# Patient Record
Sex: MALE | Race: WHITE | NOT HISPANIC OR LATINO | URBAN - METROPOLITAN AREA
[De-identification: names, ages, dates, MRNs, and addresses within clinical notes are randomized per-mention and may not be internally consistent; named-entity substitution may affect disease eponyms.]

---

## 2017-07-07 ENCOUNTER — EMERGENCY (EMERGENCY)
Facility: HOSPITAL | Age: 62
LOS: 1 days | Discharge: HOME | End: 2017-07-07
Attending: EMERGENCY MEDICINE
Payer: SELF-PAY

## 2017-07-07 VITALS
TEMPERATURE: 98 F | OXYGEN SATURATION: 98 % | HEART RATE: 88 BPM | WEIGHT: 195.11 LBS | HEIGHT: 71 IN | RESPIRATION RATE: 18 BRPM | DIASTOLIC BLOOD PRESSURE: 81 MMHG | SYSTOLIC BLOOD PRESSURE: 129 MMHG

## 2017-07-07 VITALS
DIASTOLIC BLOOD PRESSURE: 73 MMHG | OXYGEN SATURATION: 98 % | HEART RATE: 92 BPM | RESPIRATION RATE: 17 BRPM | SYSTOLIC BLOOD PRESSURE: 149 MMHG

## 2017-07-07 DIAGNOSIS — M79.671 PAIN IN RIGHT FOOT: ICD-10-CM

## 2017-07-07 DIAGNOSIS — Z79.2 LONG TERM (CURRENT) USE OF ANTIBIOTICS: ICD-10-CM

## 2017-07-07 DIAGNOSIS — M25.561 PAIN IN RIGHT KNEE: ICD-10-CM

## 2017-07-07 PROCEDURE — 99284 EMERGENCY DEPT VISIT MOD MDM: CPT | Mod: 25

## 2017-07-07 PROCEDURE — 99283 EMERGENCY DEPT VISIT LOW MDM: CPT

## 2017-07-07 PROCEDURE — 73562 X-RAY EXAM OF KNEE 3: CPT

## 2017-07-07 PROCEDURE — 73630 X-RAY EXAM OF FOOT: CPT | Mod: 26,LT

## 2017-07-07 PROCEDURE — 73630 X-RAY EXAM OF FOOT: CPT

## 2017-07-07 PROCEDURE — 73562 X-RAY EXAM OF KNEE 3: CPT | Mod: 26,RT

## 2017-07-07 RX ORDER — IBUPROFEN 200 MG
600 TABLET ORAL ONCE
Qty: 0 | Refills: 0 | Status: COMPLETED | OUTPATIENT
Start: 2017-07-07 | End: 2017-07-07

## 2017-07-07 RX ADMIN — Medication 600 MILLIGRAM(S): at 08:54

## 2017-07-07 NOTE — ED PROVIDER NOTE - OBJECTIVE STATEMENT
63 y/o M pt w/ no significant PMHx presents to the ED for R knee pain and R heel pain today. Pt is concerned for his R knee pain because a family member of his passed away due to R knee cancer after two weeks of their sx being onset. 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 Pt denies weight loss, fevers/chills, or any other complaints. NKDA.

## 2017-07-07 NOTE — ED ADULT NURSE NOTE - OBJECTIVE STATEMENT
Patient complains of right knee pain x 4 days. Denies any trauma. No swelling, gross deformity noted. Ambulated with steady gait.

## 2017-07-07 NOTE — ED ADULT TRIAGE NOTE - CHIEF COMPLAINT QUOTE
c/o right knee pain x 4 days, denies injury, pt is concerned because his uncle had knee cancer, ambulates steadily

## 2017-07-07 NOTE — ED PROVIDER NOTE - PROGRESS NOTE DETAILS
Warren Spivey was the scribe assigned to work with Dr. Dr. Ange Moise on 04/07/2017. I, Janet Mackenzie (Chief Scribe), am signing on behalf of Warren Spivey and attestating to the fact that Warren Spivey  worked the assigned shift with Dr. Dr. Ange Moise on providing scribe services.

## 2017-07-07 NOTE — ED PROVIDER NOTE - MEDICAL DECISION MAKING DETAILS
Pt presents with R knee pain and R heel pain. Will get X-ray of R knee and x-ray of R foot. Will wash foot and check for any obvious foreign body. Will give Motrin for pain.

## 2017-10-04 ENCOUNTER — EMERGENCY (EMERGENCY)
Facility: HOSPITAL | Age: 62
LOS: 1 days | Discharge: HOME | End: 2017-10-04
Attending: EMERGENCY MEDICINE
Payer: MEDICARE

## 2017-10-04 VITALS
DIASTOLIC BLOOD PRESSURE: 78 MMHG | WEIGHT: 199.96 LBS | HEIGHT: 70 IN | SYSTOLIC BLOOD PRESSURE: 122 MMHG | OXYGEN SATURATION: 99 % | HEART RATE: 100 BPM | RESPIRATION RATE: 16 BRPM | TEMPERATURE: 98 F
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
## 2017-10-04 DIAGNOSIS — M79.671 PAIN IN RIGHT FOOT: ICD-10-CM
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
## 2017-10-04 DIAGNOSIS — S91.341A PUNCTURE WOUND WITH FOREIGN BODY, RIGHT FOOT, INITIAL ENCOUNTER: ICD-10-CM
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
## 2017-10-04 DIAGNOSIS — H93.293 OTHER ABNORMAL AUDITORY PERCEPTIONS, BILATERAL: ICD-10-CM

## 2017-10-04 PROCEDURE — 73630 X-RAY EXAM OF FOOT: CPT

## 2017-10-04 PROCEDURE — 73630 X-RAY EXAM OF FOOT: CPT | Mod: 26,RT

## 2017-10-04 PROCEDURE — 99284 EMERGENCY DEPT VISIT MOD MDM: CPT | Mod: 25

## 2017-10-04 PROCEDURE — 99284 EMERGENCY DEPT VISIT MOD MDM: CPT

## 2017-10-04 NOTE — CONSULT NOTE ADULT - SUBJECTIVE AND OBJECTIVE BOX
S : 62y year old Male seen in the ED for Right foot pain.      HPI:  Patient states he possibly stepped on glass or some other material. Patient relates to having pain on the foot for about 1 week.  Patient has had no previous treatment for the pain. patient denies any Fever, Nausea, vomiting, and shortness of breath.    PMH: No pertinent past medical history  [place] (hard of hearing), bilateral    PSH:No significant past surgical history      Allergies:No Known Allergies      T(F): 97.8 (10-04-17 @ 07:34), Max: 97.8 (10-04-17 @ 07:34)  HR: 100 (10-04-17 @ 07:34) (100 - 100)  BP: 122/78 (10-04-17 @ 07:34) (122/78 - 122/78)  RR: 16 (10-04-17 @ 07:34) (16 - 16)  SpO2: 99% (10-04-17 @ 07:34) (99% - 99%)  Wt(kg): --    O:   General: Pleasant  male NAD & AOX3.    Integument:  Skin warm, dry and supple bilateral.    Vascular: Dorsalis Pedis and Posterior Tibial pulses 2/4.  Capillary re-fill time less then 3 seconds digits 1-5 bilateral.    Neuro: Protective sensation intact to the level of the digits bilateral.  MSK: Muscle strength 5/5 all major muscle groups bilateral.  Deformity:  A: Right foot pain secondary to possible foreign body      P:   Chart reviewed and Patient evaluated  Discussed diagnosis and treatment with patient  Cleaned affected area with alcohol pads.  2% lidocaine was injected around the affected area   Callous and hematoma at the affected area was debrided.  Unsuccessful attempt at removing any foreign body  Applied dry sterile dressing  X-rays reviewed : Shows foreign body at base of 5th proximal phalanx away from site of complaint.   Ordered RAISA  Patient is to return Wednesday(10/11/17) at 10:15AM for removal of possible foreign body.  Patient has been booked for surgery on Wednesday.  Discussed with Dr. Oliva

## 2017-10-04 NOTE — ED PROVIDER NOTE - MEDICAL DECISION MAKING DETAILS
63 y/o M pt presents with R foot pain and foreign body sensation in R foot. Plan for X-Ray of R foot, podiatry consult, and reassess. Pt declined analgesia.

## 2017-10-04 NOTE — ED PROVIDER NOTE - PROGRESS NOTE DETAILS
pt seen by podiatry.  consult appreciated.  Will d/c with podiatry follow up. Pt phone number is [phone]

## 2018-04-05 ENCOUNTER — EMERGENCY (EMERGENCY)
Facility: HOSPITAL | Age: 63
LOS: 1 days | Discharge: HOME | End: 2018-04-05
Attending: EMERGENCY MEDICINE
Payer: MEDICARE

## 2018-04-05 VITALS
SYSTOLIC BLOOD PRESSURE: 144 MMHG | HEART RATE: 77 BPM | RESPIRATION RATE: 18 BRPM | TEMPERATURE: 98 F | DIASTOLIC BLOOD PRESSURE: 86 MMHG | OXYGEN SATURATION: 100 %

## 2018-04-05 VITALS
SYSTOLIC BLOOD PRESSURE: 145 MMHG | HEART RATE: 98 BPM | TEMPERATURE: 98 F | RESPIRATION RATE: 16 BRPM | OXYGEN SATURATION: 98 % | DIASTOLIC BLOOD PRESSURE: 81 MMHG

## 2018-04-05 PROCEDURE — 99284 EMERGENCY DEPT VISIT MOD MDM: CPT

## 2018-04-05 PROCEDURE — 99284 EMERGENCY DEPT VISIT MOD MDM: CPT | Mod: 25

## 2018-04-05 PROCEDURE — 11000 DBRDMT ECZ/INFECTED SKIN<10%: CPT

## 2018-04-05 PROCEDURE — 73620 X-RAY EXAM OF FOOT: CPT

## 2018-04-05 PROCEDURE — 73620 X-RAY EXAM OF FOOT: CPT | Mod: 26,50

## 2018-04-05 NOTE — ED PROVIDER NOTE - OBJECTIVE STATEMENT
61 y/o M pt with PMHx of [place] and no significant PSHx presents to ED c/o foreign body in b/l feet x2 days. 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 Pt now notes b/l foot pain. Per pt, glass is in the heels of b/l feet. Pt reports having had similar episodes of glass in b/l feet on multiple occasions in the past. Pt denies any other complaints. NKDA.

## 2018-04-05 NOTE — CONSULT NOTE ADULT - SUBJECTIVE AND OBJECTIVE BOX
S : 62y year old Male seen at bedside for stepping on glass bilateral feet.  Patient states he was walking barefoot and felt that he stepped on glass. Patient complains of pain in both feet, particularly the heel. Patient pinpoint to an area of pain. Patient states he attempted to take out the glass at home, however was unsuccessful hence he came into the ED.     Chief Complaint : Patient is a 62y old  Male who presents with a chief complaint of glass in feet     HPI: 61 y/o M pt with PMHx of [place] and no significant PSHx presents to ED c/o foreign body in b/l feet x2 days. 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 Pt now notes b/l foot pain. Per pt, glass is in the heels of b/l feet. Pt reports having had similar episodes of glass in b/l feet on multiple occasions in the past. Pt denies any other complaints. NKDA.    Patient admits to  (-) Fevers, (-) Chills, (-) Nausea, (-) Vomiting, (-) Shortness of Breath      PMH: No pertinent past medical history  [place] (hard of hearing), bilateral    PSH:No significant past surgical history      Allergies:No Known Allergies      Labs:      WBC Trend      Chem              T(F): 98.5 (04-05-18 @ 19:40), Max: 98.5 (04-05-18 @ 19:40)  HR: 77 (04-05-18 @ 19:40) (77 - 98)  BP: 144/86 (04-05-18 @ 19:40) (144/86 - 145/81)  RR: 18 (04-05-18 @ 19:40) (16 - 18)  SpO2: 100% (04-05-18 @ 19:40) (98% - 100%)  Wt(kg): --    O:   General: Pleasant  male NAD & AOX3.    Integument:  Skin warm, dry and supple bilateral.    Dermatological: Both plantar feet filled with dirt, nails elongated and mycotic in nature. Elevated heel surface consistent with foreign body presence.   Neuro: Protective sensation intact/diminished to the level of the digits bilateral.  MSK: Muscle strength 5/5 all major muscle groups bilateral. POP of bilateral heels         Deformity:  A: Bilateral feet, foreign body present       P:   Chart reviewed and Patient evaluated  Discussed diagnosis and treatment with patient  Bedside procedure performed: Excisional debridement of skin using #15 blade inclusive of skin of right and left foot. Foreign body taken out from bilateral heels. Site flushed wither sterile saline. Applied bandaid.   X-rays reviewed : Presence of foreign body bilateral heels   Patient told about clinical signs of infection and if present along with increase of pain to return to ED  Patient to follow [facility] t [address] [facility] [id number]   Discussed with Dr. Oliva

## 2018-04-05 NOTE — ED PROVIDER NOTE - ATTENDING CONTRIBUTION TO CARE
61 y/o M pt presents with foreign body sensation to b/l feet. Plan for X-Ray of b/l feet, and will reassess.

## 2018-04-05 NOTE — ED ADULT TRIAGE NOTE - CHIEF COMPLAINT QUOTE
C/o "I have glass in my right foot. There may be some in my left foot also. 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

## 2018-04-05 NOTE — ED PROVIDER NOTE - MEDICAL DECISION MAKING DETAILS
63 y/o M pt presents with foreign body sensation to b/l feet. Plan for X-Ray of b/l feet, and will reassess.

## 2018-04-05 NOTE — ED PROVIDER NOTE - PROGRESS NOTE DETAILS
Dr Alejandro (podiatry) removed 3 pieces of glasses. Will dc with outpatient follow up at the [address] [place] [address]. Pt is well appearing walking with steady gait, stable for discharge and follow up without fail with medical doctor. I had a detailed discussion with the patient and/or guardian regarding the historical points, exam findings, and any diagnostic results supporting the discharge diagnosis. Pt educated on care and need for follow up. Strict return instructions and red flag signs and symptoms discussed with patient. Questions answered. Pt shows understanding of discharge information and agrees to follow.

## 2018-06-18 NOTE — ED PROVIDER NOTE - OBJECTIVE STATEMENT
Patient/Caregiver provided printed discharge information. 61 y/o M pt with PMHx of [place] and no significant PSHx presents to ED c/o foreign body sensation in R foot s/p stepping on a piece of glass x3 days ago. As per pt, pt is concerned for foreign body in R foot due to increasing/worsening pain in R foot, prompting pt to visit ED today for further evaluation. Pt notes he is UTD with tetanus vaccination. Pt denies any other complaints. NKDA.

## 2019-09-09 ENCOUNTER — EMERGENCY (EMERGENCY)
Facility: HOSPITAL | Age: 64
LOS: 1 days | Discharge: HOME | End: 2019-09-09
Attending: EMERGENCY MEDICINE
Payer: SUBSIDIZED

## 2019-09-09 VITALS
HEIGHT: 71 IN | SYSTOLIC BLOOD PRESSURE: 148 MMHG | WEIGHT: 190.04 LBS | DIASTOLIC BLOOD PRESSURE: 104 MMHG | RESPIRATION RATE: 20 BRPM | TEMPERATURE: 98 F | HEART RATE: 104 BPM | OXYGEN SATURATION: 98 %

## 2019-09-09 PROCEDURE — 99283 EMERGENCY DEPT VISIT LOW MDM: CPT | Mod: 25

## 2019-09-09 PROCEDURE — 73630 X-RAY EXAM OF FOOT: CPT

## 2019-09-09 PROCEDURE — 73630 X-RAY EXAM OF FOOT: CPT | Mod: 26,RT

## 2019-09-09 PROCEDURE — 99283 EMERGENCY DEPT VISIT LOW MDM: CPT

## 2019-09-09 RX ORDER — IBUPROFEN 200 MG
1 TABLET ORAL
Qty: 30 | Refills: 0
Start: 2019-09-09

## 2019-09-09 RX ORDER — AZTREONAM 2 G
1 VIAL (EA) INJECTION
Qty: 20 | Refills: 0
Start: 2019-09-09 | End: 2019-09-18

## 2019-09-09 NOTE — ED PROVIDER NOTE - PROGRESS NOTE DETAILS
Patient XR does not demonstrate any evidence of foreign body in patient laceration, however there are multiple punctate spots on patient's XR, likely due to dirt and soil on patients foot. Will discharge home with antibiotics and podiatry follow up.

## 2019-09-09 NOTE — ED PROVIDER NOTE - OBJECTIVE STATEMENT
64 y.o. M with a pertinent PMHx of Hard of hearing B/L and no pertinent PSHx presents to the ED for evaluation and possible foreign body removal, c/o R foot pain since today, status post stepping on what he believes to be a piece of glass that then lodged in his foot 2 days ago. Patient did not have pain until today and feels it in location of his cut, and relates that the pain is exacerbated when he puts pressure on his foot. He endorses his tetanus vaccine is UTD as of 6 months ago. Otherwise, patient denies a hx of DM, numbness or weakness in the foot, or any other acute complaints at this time. NKDA.

## 2019-09-09 NOTE — ED PROVIDER NOTE - PATIENT PORTAL LINK FT
You can access the FollowMyHealth Patient Portal offered by [facility] by registering at the following website: [email or web address]. By joining [employer]’s FollowMyHealth portal, you will also be able to view your health information using other applications (apps) compatible with our system.

## 2019-09-09 NOTE — ED ADULT NURSE NOTE - NSIMPLEMENTINTERV_GEN_ALL_ED
Implemented All Universal Safety Interventions:  [place] to call system. Call bell, personal items and telephone within reach. Instruct patient to call for assistance. Room bathroom lighting operational. Non-slip footwear when patient is off stretcher. Physically safe environment: no spills, clutter or unnecessary equipment. Stretcher in lowest position, wheels locked, appropriate side rails in place.

## 2019-09-09 NOTE — ED PROVIDER NOTE - SKIN WOUND DESCRIPTION
Less than 1 cm linear, non-gaping laceration under the head of the 1st metatarsal. Good pulses, good sensation, no deformity, no foreign bodies, no bleeding

## 2020-08-17 ENCOUNTER — EMERGENCY (EMERGENCY)
Facility: HOSPITAL | Age: 65
LOS: 1 days | Discharge: HOME | End: 2020-08-17
Attending: STUDENT IN AN ORGANIZED HEALTH CARE EDUCATION/TRAINING PROGRAM
Payer: SELF-PAY

## 2020-08-17 VITALS
DIASTOLIC BLOOD PRESSURE: 88 MMHG | TEMPERATURE: 99 F | RESPIRATION RATE: 20 BRPM | SYSTOLIC BLOOD PRESSURE: 151 MMHG | OXYGEN SATURATION: 99 % | HEART RATE: 92 BPM

## 2020-08-17 VITALS
HEART RATE: 84 BPM | TEMPERATURE: 98 F | SYSTOLIC BLOOD PRESSURE: 128 MMHG | WEIGHT: 222.67 LBS | RESPIRATION RATE: 20 BRPM | OXYGEN SATURATION: 95 % | HEIGHT: 68 IN | DIASTOLIC BLOOD PRESSURE: 83 MMHG

## 2020-08-17 PROCEDURE — 90715 TDAP VACCINE 7 YRS/> IM: CPT

## 2020-08-17 PROCEDURE — 82962 GLUCOSE BLOOD TEST: CPT

## 2020-08-17 PROCEDURE — 99283 EMERGENCY DEPT VISIT LOW MDM: CPT | Mod: 25

## 2020-08-17 PROCEDURE — 73620 X-RAY EXAM OF FOOT: CPT | Mod: 26,50

## 2020-08-17 PROCEDURE — 99283 EMERGENCY DEPT VISIT LOW MDM: CPT

## 2020-08-17 PROCEDURE — 73620 X-RAY EXAM OF FOOT: CPT

## 2020-08-17 PROCEDURE — 90471 IMMUNIZATION ADMIN: CPT

## 2020-08-17 RX ORDER — LIDOCAINE HYDROCHLORIDE AND EPINEPHRINE 10; 10 MG/ML; UG/ML
10 INJECTION, SOLUTION INFILTRATION; PERINEURAL ONCE
Refills: 0 | Status: COMPLETED | OUTPATIENT
Start: 2020-08-17 | End: 2020-08-17

## 2020-08-17 RX ORDER — BACITRACIN ZINC 500 UNIT/G
1 OINTMENT IN PACKET (EA) TOPICAL ONCE
Refills: 0 | Status: COMPLETED | OUTPATIENT
Start: 2020-08-17 | End: 2020-08-17

## 2020-08-17 RX ORDER — TETANUS TOXOID, REDUCED DIPHTHERIA TOXOID AND ACELLULAR PERTUSSIS VACCINE, ADSORBED 5; 2.5; 8; 8; 2.5 [IU]/.5ML; [IU]/.5ML; UG/.5ML; UG/.5ML; UG/.5ML
0.5 SUSPENSION INTRAMUSCULAR ONCE
Refills: 0 | Status: COMPLETED | OUTPATIENT
Start: 2020-08-17 | End: 2020-08-17

## 2020-08-17 RX ORDER — ACETAMINOPHEN 500 MG
650 TABLET ORAL ONCE
Refills: 0 | Status: COMPLETED | OUTPATIENT
Start: 2020-08-17 | End: 2020-08-17

## 2020-08-17 RX ADMIN — Medication 1 APPLICATION(S): at 12:00

## 2020-08-17 RX ADMIN — Medication 650 MILLIGRAM(S): at 09:30

## 2020-08-17 RX ADMIN — LIDOCAINE HYDROCHLORIDE AND EPINEPHRINE 10 MILLILITER(S): 10; 10 INJECTION, SOLUTION INFILTRATION; PERINEURAL at 09:19

## 2020-08-17 RX ADMIN — TETANUS TOXOID, REDUCED DIPHTHERIA TOXOID AND ACELLULAR PERTUSSIS VACCINE, ADSORBED 0.5 MILLILITER(S): 5; 2.5; 8; 8; 2.5 SUSPENSION INTRAMUSCULAR at 09:55

## 2020-08-17 RX ADMIN — Medication 650 MILLIGRAM(S): at 09:18

## 2020-08-17 NOTE — ED ADULT NURSE NOTE - OBJECTIVE STATEMENT
64 yo M [facility], no meds at home p/w pain to the bottom of bilat feet with glass in the bottom of bilat feet. 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 Pt felt well before the event and denies falling, other pain/injuries, fever/infectious symptoms, chest pain, focal numbness/weakness, N/V, other recent illness or hospitalizations

## 2020-08-17 NOTE — ED PROVIDER NOTE - PROGRESS NOTE DETAILS
0.5cm piece of glass removed with tweezers from R heel. No other FBs found. Pt reports improvement of pain.

## 2020-08-17 NOTE — ED PROVIDER NOTE - PATIENT PORTAL LINK FT
You can access the FollowMyHealth Patient Portal offered by [facility] by registering at the following website: [email or web address]. By joining [employer]’s FollowMyHealth portal, you will also be able to view your health information using other applications (apps) compatible with our system.

## 2020-08-17 NOTE — ED PROVIDER NOTE - PHYSICAL EXAMINATION
Vital Signs Reviewed  GEN: Comfortable, NAD, AAOx3  HEENT: NCAT, EOMI, MMM, Neck Supple  RESP: CTAB, No rales/rhonchi/wheezing  CV: RRR, S1S2, No murmurs  ABD: No TTP, ND, No masses, No CVA Tenderness  Extrem/Skin: Equal pulses bilat, No cyanosis/edema/rashes  Bilat feet soiled with small amount of dried blood on bottom of R heel, no FB visualized before cleaning  Neuro: No focal deficits

## 2020-08-17 NOTE — ED PROVIDER NOTE - NSFOLLOWUPINSTRUCTIONS_ED_ALL_ED_FT
You were seen in the emergency room today for glass in your foot. Please keep the feet clean and covered with neosporin or other antibiotic ointment on the bandage.    Please see a podiatrist if you have any persistent symptoms or return to the ER for any worsening symptoms.    We no longer feel that you need further emergency care or admission to the hospital at this time.    While we have determined that you are currently stable for discharge, we know that things can change. Please seek immediate medical attention or return to the ER if you experience any of the following:  Any worsening or persistent symptoms  Redness, worsening pain, or pus from the foot  Severe Pain  Chest Pain  Difficulty Breathing  Bleeding  Passing Out  Severe Rash  Inability to Eat or Drink  Persistent Fever    Please see a primary care doctor or specialist within 5 days to ensure that you are improving.    Please call the [facility] phone numbers on this document if you have any problems obtaining a follow up appointment.    I wish you well! -Dr Rubalcava

## 2020-08-17 NOTE — ED ADULT NURSE NOTE - NSIMPLEMENTINTERV_GEN_ALL_ED
Implemented All Universal Safety Interventions:  [place] to call system. Call bell, personal items and telephone within reach. Instruct patient to call for assistance. Room bathroom lighting operational. Non-slip footwear when patient is off stretcher. Physically safe environment: no spills, clutter or unnecessary equipment. Stretcher in lowest position, wheels locked, appropriate side rails in place.

## 2020-08-17 NOTE — ED PROVIDER NOTE - NS ED ROS FT
Patient Reports No  Fever/Chills  Nausea/Vomiting/Diarrhea  Urinary Symptoms  Chest Pain, Shortness of Breath  Syncope, Orthopnea  Focal Numbness or Weakness  Other Recent Illness or Hospitalizations

## 2020-08-17 NOTE — ED ADULT NURSE NOTE - CHPI ED NUR SYMPTOMS NEG
no purulent drainage/no rectal pain/no redness/no blood in mucus/no vomiting/no bleeding at site/no chills/no drainage/no fever

## 2020-08-17 NOTE — ED PROVIDER NOTE - CLINICAL SUMMARY MEDICAL DECISION MAKING FREE TEXT BOX
Concern for FB glass in bilat feet however difficult to examine as feet extensively soiled. Will clean and then examine further. Obtaining XRs and FS. Giving tylenol and tetanus. Pt stable. Concern for FB glass in bilat feet however difficult to examine as feet extensively soiled. Will clean and then examine further. Obtaining XRs and FS. Giving tylenol and tetanus. Pt stable.  Glass successfully removed from R heel with no complications. Will refer to podiatry if persistent symptoms. Discussed with pt my clinical impression and results, patient given strict return precautions if persistent or worsening of symptoms occurs, and need for close follow up. Pt well appearing with a reassuring exam. Pt expressed understanding and agrees with plan. Discharge home with PMD or Specialist f/u within 5 days.

## 2022-05-31 ENCOUNTER — EMERGENCY (EMERGENCY)
Facility: HOSPITAL | Age: 67
LOS: 1 days | Discharge: HOME | End: 2022-05-31
Attending: EMERGENCY MEDICINE
Payer: SELF-PAY

## 2022-05-31 VITALS
HEIGHT: 68 IN | WEIGHT: 220.02 LBS | TEMPERATURE: 98 F | SYSTOLIC BLOOD PRESSURE: 134 MMHG | HEART RATE: 74 BPM | DIASTOLIC BLOOD PRESSURE: 87 MMHG | RESPIRATION RATE: 18 BRPM | OXYGEN SATURATION: 96 %

## 2022-05-31 PROCEDURE — 73630 X-RAY EXAM OF FOOT: CPT

## 2022-05-31 PROCEDURE — 99283 EMERGENCY DEPT VISIT LOW MDM: CPT | Mod: 25

## 2022-05-31 PROCEDURE — 73630 X-RAY EXAM OF FOOT: CPT | Mod: 26,LT

## 2022-05-31 RX ORDER — CIPROFLOXACIN LACTATE 400MG/40ML
1 VIAL (ML) INTRAVENOUS
Qty: 10 | Refills: 0
Start: 2022-05-31 | End: 2022-06-04

## 2022-05-31 NOTE — ED ADULT NURSE NOTE - ISOLATION TYPE:
None

## 2022-05-31 NOTE — ED PROVIDER NOTE - OBJECTIVE STATEMENT
66 year old male with PMHx of hard of hearing is presenting to the ED with chief complaint of left foot foreign body sensation for the past 2 days after walking on broken glass. Pain is present on the plantar aspect of the foot. NKDA

## 2022-05-31 NOTE — ED PROVIDER NOTE - NSFOLLOWUPINSTRUCTIONS_ED_ALL_ED_FT
Keep the dressing on the foot for 48 hours.  Follow up with the primary care doctor in 2-3 days.  If you experience any new or worsening symptoms or if you are concerned you can always come back to the emergency for a re-evaluation.  If there were any prescriptions given to you during the visit today take them as prescribed. If you have any questions you can ask the pharmacist.

## 2022-05-31 NOTE — ED ADULT NURSE NOTE - OBJECTIVE STATEMENT
States he stepped on a piece of glass yesterday , c/o glass in left foot .Patient is hard of hearing .

## 2022-05-31 NOTE — ED PROVIDER NOTE - PATIENT PORTAL LINK FT
You can access the FollowMyHealth Patient Portal offered by [facility] by registering at the following website: [email or web address]. By joining [employer]’s FollowMyHealth portal, you will also be able to view your health information using other applications (apps) compatible with our system.

## 2022-05-31 NOTE — ED PROVIDER NOTE - PROGRESS NOTE DETAILS
FB removed. Tolerated well. Will give Cipro for prophylaxis. Pt is well appearing walking with steady gait, stable for discharge and follow up without fail with medical doctor. I had a detailed discussion with the patient and/or guardian regarding the historical points, exam findings, and any diagnostic results supporting the discharge diagnosis. Pt educated on care and need for follow up. Strict return instructions and red flag signs and symptoms discussed with patient. Questions answered. Pt shows understanding of discharge information and agrees to follow.

## 2022-05-31 NOTE — ED PROVIDER NOTE - MUSCULOSKELETAL, MLM
Left foot with superficial healing. Laceration with pinpoint tenderness to palpation. Spine appears normal, range of motion is not limited, no muscle or joint tenderness Left foot with superficial healing laceration with pinpoint tenderness to palpation. Spine appears normal, range of motion is not limited, no muscle or joint tenderness

## 2022-05-31 NOTE — ED ADULT NURSE NOTE - TEMPLATE LIST FOR HEAD TO TOE ASSESSMENT
Wounds Drysol Counseling:  I discussed with the patient the risks of drysol/aluminum chloride including but not limited to skin rash, itching, irritation, burning.

## 2022-05-31 NOTE — ED PROVIDER NOTE - CLINICAL SUMMARY MEDICAL DECISION MAKING FREE TEXT BOX
Xray showed foreign body. Foreign body was removed (piece of glass). All symptoms resolved and will give Cipro for syphilaxis. PMD follow up for wound check in 2-3 days. Xray showed foreign body. Foreign body was removed (piece of glass). All symptoms resolved and will give Cipro for prophylaxis. PMD follow up for wound check in 2-3 days.

## 2022-10-21 ENCOUNTER — EMERGENCY (EMERGENCY)
Facility: HOSPITAL | Age: 67
LOS: 1 days | Discharge: HOME | End: 2022-10-21
Payer: SELF-PAY

## 2022-10-21 VITALS
HEART RATE: 80 BPM | RESPIRATION RATE: 18 BRPM | DIASTOLIC BLOOD PRESSURE: 77 MMHG | SYSTOLIC BLOOD PRESSURE: 120 MMHG | OXYGEN SATURATION: 99 %

## 2022-10-21 VITALS
TEMPERATURE: 98 F | WEIGHT: 190.04 LBS | HEIGHT: 68 IN | RESPIRATION RATE: 18 BRPM | SYSTOLIC BLOOD PRESSURE: 118 MMHG | DIASTOLIC BLOOD PRESSURE: 86 MMHG | OXYGEN SATURATION: 99 % | HEART RATE: 95 BPM

## 2022-10-21 PROCEDURE — 97597 DBRDMT OPN WND 1ST 20 CM/<: CPT | Mod: XU

## 2022-10-21 PROCEDURE — 73630 X-RAY EXAM OF FOOT: CPT

## 2022-10-21 PROCEDURE — 99283 EMERGENCY DEPT VISIT LOW MDM: CPT

## 2022-10-21 PROCEDURE — 10120 INC&RMVL FB SUBQ TISS SMPL: CPT

## 2022-10-21 PROCEDURE — 99284 EMERGENCY DEPT VISIT MOD MDM: CPT | Mod: 25

## 2022-10-21 PROCEDURE — 73630 X-RAY EXAM OF FOOT: CPT | Mod: 26,RT,77

## 2022-10-21 PROCEDURE — 73630 X-RAY EXAM OF FOOT: CPT | Mod: 26,RT

## 2022-10-21 PROCEDURE — 11042 DBRDMT SUBQ TIS 1ST 20SQCM/<: CPT

## 2022-10-21 RX ORDER — ACETAMINOPHEN 500 MG
650 TABLET ORAL ONCE
Refills: 0 | Status: COMPLETED | OUTPATIENT
Start: 2022-10-21 | End: 2022-10-21

## 2022-10-21 RX ADMIN — Medication 650 MILLIGRAM(S): at 12:10

## 2022-10-21 RX ADMIN — Medication 650 MILLIGRAM(S): at 11:40

## 2022-10-21 RX ADMIN — Medication 1 TABLET(S): at 14:24

## 2022-10-21 NOTE — ED PROVIDER NOTE - NSFOLLOWUPCLINICS_GEN_ALL_ED_FT
[facility]  Podiatry/Wound Care  [address] [place] [address]  Phone: [phone]  Fax: [phone]

## 2022-10-21 NOTE — CONSULT NOTE ADULT - SUBJECTIVE AND OBJECTIVE BOX
Podiatry HPI: Patient is a 67y old  Male who presents with a chief complaint of a foreign body in his right foot. Patient has a slightly disheveled appearance, seen wearing Croc type shoes. 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 He thinks he stepped on a piece of glass 2 days ago and it has been bothering him. Patient reports he does not hear well. Has no other past medical history. Does not take any medication. He reports he frequently steps on things while working on his art. Reports no other pedal complaints. Has no constitutional symptoms.      PMH: [place] (hard of hearing), bilateral    No pertinent past medical history      Allergies: No Known Allergies    Medications:   FH:  PSX: No significant past surgical history      SH:     Vital Signs Last 24 Hrs  T(C): 36.6 (21 Oct 2022 10:50), Max: 36.6 (21 Oct 2022 10:50)  T(F): 97.8 (21 Oct 2022 10:50), Max: 97.8 (21 Oct 2022 10:50)  HR: 80 (21 Oct 2022 14:30) (80 - 95)  BP: 120/77 (21 Oct 2022 14:30) (118/86 - 120/77)  BP(mean): --  RR: 18 (21 Oct 2022 14:30) (18 - 18)  SpO2: 99% (21 Oct 2022 14:30) (99% - 99%)    Parameters below as of 21 Oct 2022 14:30  Patient On (Oxygen Delivery Method): room air        LABS                      ROS  REVIEW OF SYSTEMS: unremarkable outside of PE        PHYSICAL EXAM  LE Focused:    Vasc:  DP and PT pulses 2/4 bilateral. TG: warm to cool. No edema. Pedal hair absent  Derm: right foot submet 4 dark foreign body visible within the hyperkeratotic tissue, sized 0.5x0.2, no edema, erythema, purulence, or any other drainage seen on exam. Patient's feet have some other dirt and debris   Neuro: protective sensation intact  MSK: patient has antalgic gate due to the foreign body in his right foot       IMAGING:     CULTURES:     A:  Right foot superficial foreign object    P:   Patient evaluated and Chart reviewed   Discussed diagnosis and treatment with patient  Verbal consent obtained for debridement of foreign object in hyperkeratotic tissue  Debridement of hyperkeratotic tissue with 15 blade of Right foot submet 4, foreign object located superficially and is almost immediately removed  No drainage, edema or erythema is seen upon debridement  Area is cleaned with betadine  Applied band aid to right foot submet 4 area post debridement  Pre and post debridement X-rays reviewed to ensure foreign body is removed  Weight bearing as tolerated   Offloading to bilateral Heels.   Patient to follow-up at [facility] with any further complaints. [address] [facility], [address]nd [address], [address] [phone]  Discussed with Attending Dr. Sellers

## 2022-10-21 NOTE — ED PROVIDER NOTE - NSFOLLOWUPINSTRUCTIONS_ED_ALL_ED_FT
Puncture Wound      A puncture wound is an injury that is caused by a sharp, thin object that goes through (penetrates) your skin. Usually, a puncture wound does not leave a large opening in your skin, so it may not bleed a lot. However, when you get a puncture wound, dirt or other materials (foreign bodies) can be forced into your wound and can break off inside. This increases the chance of infection, such as tetanus. There are many sharp, pointed objects that can cause puncture wounds, including teeth, nails, splinters of glass, fishhooks, and needles.    Treatment may include washing out the wound with a germ-free (sterile) salt-water solution, having the wound opened surgically to remove a foreign object, closing the wound with stitches (sutures), and covering the wound with antibiotic ointment and a bandage (dressing). Depending on what caused the injury, you may also need a tetanus shot or a rabies shot.      Follow these instructions at home:    Medicines     •Take or apply over-the-counter and prescription medicines only as told by your health care provider.      •If you were prescribed an antibiotic medicine, take or apply it as told by your health care provider. Do not stop using the antibiotic even if your condition improves.      Bathing     •Keep the dressing dry as told by your health care provider.      • Do not take baths, swim, or use a hot tub until your health care provider approves. Ask your health care provider if you may take showers. You may only be allowed to take sponge baths.        Wound care    •There are many ways to close and cover a wound. For example, a wound can be closed with sutures, skin glue, or adhesive strips. Follow instructions from your health care provider about how to take care of your wound. Make sure you:  •Wash your hands with soap and water before and after you change your dressing. If soap and water are not available, use hand .      •Change your dressing as told by your health care provider.      •Leave sutures, skin glue, or adhesive strips in place. These skin closures may need to stay in place for 2 weeks or longer. If adhesive strip edges start to loosen and curl up, you may trim the loose edges. Do not remove adhesive strips completely unless your health care provider tells you to do that.        •Clean the wound as told by your health care provider.      • Do not scratch or pick at the wound.    •Check your wound every day for signs of infection. Check for:  •Redness, swelling, or pain.      •Fluid or blood.      •Warmth.      •Pus or a bad smell.        General instructions     •Raise (elevate) the injured area above the level of your heart while you are sitting or lying down.      •If your puncture wound is in your foot, ask your health care provider if you need to avoid putting weight on your foot and for how long. Do not use the injured limb to support your body weight until your health care provider says that you can. Use crutches as told by your health care provider.      •Keep all follow-up visits as told by your health care provider. This is important.        Contact a health care provider if:    •You received a tetanus shot and you have swelling, severe pain, redness, or bleeding at the injection site.      •You have a fever.      •Your sutures come out.      •You notice a bad smell coming from your wound or your dressing.      •You notice something coming out of your wound, such as wood or glass.      •Your pain is not controlled with medicine.      •You have increased redness, swelling, or pain at the site of your wound.      •You have fluid, blood, or pus coming from your wound.      •You notice a change in the color of your skin near your wound.      •You need to change the dressing frequently due to fluid, blood, or pus draining from your wound.      •You develop a new rash.      •You develop numbness around your wound.      •You have warmth around your wound.        Get help right away if:    •You develop severe swelling around your wound.      •Your pain suddenly increases and is severe.      •You develop painful skin lumps.      •You have a red streak going away from your wound.    •The wound is on your hand or foot and you:  •Cannot properly move a finger or toe.      •Notice that your fingers or toes look pale or bluish.          Summary    •A puncture wound is an injury that is caused by a sharp, thin object that goes through (penetrates) your skin.      •Treatment may include washing out the wound, having the wound opened surgically to remove a foreign object, closing the wound with stitches (sutures), and covering the wound with antibiotic ointment and a bandage (dressing).      •Follow instructions from your health care provider about how to take care of your wound.      •Contact a health care provider if you have increased redness, swelling, or pain at the site of your wound.      •Keep all follow-up visits as told by your health care provider. This is important.      This information is not intended to replace advice given to you by your health care provider. Make sure you discuss any questions you have with your health care provider.

## 2022-10-21 NOTE — ED PROVIDER NOTE - OBJECTIVE STATEMENT
67 year old male is presenting to the ED with chief complaint of right foot foreign body fo the past 2 days. Patient reports that he stepped on broken glass 2 days ago and still feels that broken glass is still on the bottom of his foot. Tetanus shot was last year. No other injuries. 67 year old male is presenting to the ED with chief complaint of right foot foreign body fo the past 2 days. Patient reports that he stepped on broken glass 2 days ago while barefoot and still feels that broken glass is still on the bottom of his foot. Tetanus shot was last year. No other injuries.

## 2022-10-21 NOTE — ED PROVIDER NOTE - PATIENT PORTAL LINK FT
You can access the FollowMyHealth Patient Portal offered by [facility] by registering at the following website: [email or web address]. By joining [employer]’s FollowMyHealth portal, you will also be able to view your health information using other applications (apps) compatible with our system.

## 2022-10-21 NOTE — ED ADULT NURSE NOTE - CHIEF COMPLAINT
Pt ambulated to bathroom with steady gait      Jose F Ellis RN  09/29/20 4443 The patient is a 67y Male complaining of foreign body-skin.

## 2022-10-21 NOTE — ED PROVIDER NOTE - PROGRESS NOTE DETAILS
FB removed by podiatry.  Will send patient abx, have patient f/u with podiatry, return precautions provided.

## 2022-11-06 ENCOUNTER — EMERGENCY (EMERGENCY)
Facility: HOSPITAL | Age: 67
LOS: 1 days | Discharge: HOME | End: 2022-11-06
Attending: EMERGENCY MEDICINE
Payer: SELF-PAY

## 2022-11-06 VITALS
HEART RATE: 92 BPM | DIASTOLIC BLOOD PRESSURE: 74 MMHG | SYSTOLIC BLOOD PRESSURE: 153 MMHG | RESPIRATION RATE: 18 BRPM | TEMPERATURE: 98 F | OXYGEN SATURATION: 96 % | WEIGHT: 184.97 LBS | HEIGHT: 68 IN

## 2022-11-06 PROCEDURE — 73630 X-RAY EXAM OF FOOT: CPT | Mod: 26,50

## 2022-11-06 PROCEDURE — 10120 INC&RMVL FB SUBQ TISS SMPL: CPT

## 2022-11-06 PROCEDURE — 73630 X-RAY EXAM OF FOOT: CPT

## 2022-11-06 PROCEDURE — 99283 EMERGENCY DEPT VISIT LOW MDM: CPT

## 2022-11-06 PROCEDURE — 73630 X-RAY EXAM OF FOOT: CPT | Mod: 26,50,77

## 2022-11-06 PROCEDURE — 99284 EMERGENCY DEPT VISIT MOD MDM: CPT | Mod: 25

## 2022-11-06 NOTE — ED PROVIDER NOTE - ATTENDING APP SHARED VISIT CONTRIBUTION OF CARE
seen with acp  states he has glass in his feet  PE foreign body palpated  xrays show foreign body adjacent to right 5th toe  and left heel area  Podiatry consulted for possible removal  Tetanus updated  agree with acps assessment hx and physical and disposition

## 2022-11-06 NOTE — ED PROVIDER NOTE - NSFOLLOWUPINSTRUCTIONS_ED_ALL_ED_FT
Follow up with the podiatrist within 1 week. Call tomorrow to make an appointment.     [facility]  Podiatry/Wound Care  [address] [place] [address]  Phone: [phone]  Fax: [phone]    Antibiotics sent to the pharmacy for you, take them until completed, even if you are feeling better.     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     You can take motrin/tylenol as needed for pain.    If you experience any new or worsening symptoms such as infection, redness, swelling or discharge or if you are concerned you can always come back to the emergency for a re-evaluation.

## 2022-11-06 NOTE — ED PROVIDER NOTE - CPE EDP SKIN NORM
Pediatric Occupational Therapy Progress Note     Name: Ahmet Kowalski  Date of Session: 1/15/2019  MRN: [id number]  YOB: 2007  Age at evaluation: 9 years old  Referring Physician: Dr. Salty Cruz   Diagnosis: Dysgenesis of corpus callosum, Hemiparesis, ADHD        Start time: 5:35  End time: 6:15  Total time: 40 min     Visit # 2 of 20, expires 1/02/2020        Subjective: Mother brought pt to session and no new information reported. Pt reports feeling tired after long day of school     Pain: Pt with no pain or no pain behaviors reported.     Objective:  Pt seen for 40 minutes of therapeutic activity that consisted of the following to facilitate age appropriate fine motor coordination, manual dexterity, sensory tolerances, visual motor coordination, bilateral coordination, and self help skills:  - bilateral coordination exercises: hook ups x 15 sec with each leg in front with eyes open and closed, cross crawls in standing x 15, posterior cross crawls x 15, crawl lifts x 15, twist x 15  - dribbling ball with alternating hands for increased upper limb coordination 2/5 times with increased practice   - tying shoe strings with different color laces off body x 3 ; good ability to do steps 1-2 with min scaffolding  - basketball with weighted ball x 10 both hands, x 5 each hand for increased UE strength and proprioceptive input   - theraputty with pegs for increased FM strength and dexterity; placed 10 pegs into pegboard   - lacing x 8 for increased bimanual coordination; required mod scaffolding        Formal Testing:  The [employer] Oseretsky Test of Motor Proficiency (9/19/2019)     Assessment:   Ahmet was seen for a follow up occupational therapy appointment today. He continues to demonstrate increased ability to coordinate bilateral movements, and continues with fair ability to alternate hands when dribbling a ball. He displayed fair ability to perform strengthening activities with minimal reports of  hand fatigue, and also displayed fair bimanual dexterity. Per formal testing via the BOT-2, Ahmet continues to fall below his peers for fine motor precision (below average), fine motor integration (below average), manual dexterity (well below average) and upper limb coordination (below average). Ahmet has not met any goals since his last reassessment on 9/21/18, but is showing emerging progress. Continued occupational therapy is recommended to address fine motor coordination, manual dexterity, sensory integration, visual motor coordination, bilateral coordination, and self help skills.      Education: Discussed current performance and POC. Mom verbalized understanding.         GOALS:  Met goals:  Demonstrate increased visual motor coordination as displayed by ability to complete easy maze without turning paper or picking up pencil with only 3 errors. (MET)  Demonstrate increased self help independence as displayed by ability to complete buckle off body with verbal cues. (MET)  Demonstrate increased self-care skills shown by his ability to complete the 1st two steps of shoe tying with min VC. (MET)  Demonstrate increased visual motor coordination shown by his ability to complete a complex maze with no more than 2 errors. (MET)  Demonstrate increased fine motor/manual dexterity as displayed by ability to complete picking up phoebe and transfer to palm x3. (MET)  Demonstrate increased self help independence as displayed by ability to complete shoe tying with mod A. (MET)  Demonstrate increased fine motor/manual dexterity as displayed by ability to  a phoebe and transfer to palm x6. (MET)     Short term goals (12/19/18):  1. Demonstrate increased visual motor coordination as displayed by ability to complete a complex maze with without crossing boundary. (NOT MET)  2. Demonstrate increased self help independence shown by his ability to complete shoe-tying with min A. (NEW GOAL)  3. Demonstrate increased fine motor  precision shown by his ability to fold within 1/4'' of the line in 3/5 attempts. (NEW GOAL)  4. Demonstrate increased age appropriate coordination by dribbling ball alternating hands x4. (EMERGING)     Long term goals (3/19/19):  1. Demonstrate increased fine motor integration shown by his ability to replicate shapes with <1/4'' of overlap in 75% of attempts. (NEW GOAL)  2. Demonstrate increased manual dexterity shown by his ability to string 3 blocks in 15 seconds in 50% of attempts. (NEW GOAL)  3. Demonstrate increased upper limb coordination shown by his ability to throw a ball at target x 10 ft away with 50% accuracy. (NEW GOAL)  4. Demonstrate increased age appropriate coordination by dribbling ball alternating hands x8. (NOT MET)     Will reassess goals as needed.        Plan:   Occupational therapy services will be provided 1-2x/week until 3/19/2019 through direct intervention, parent education and home programming. Therapy will be discontinued when child has met all goals, is not making progress, parent discontinues therapy, and/or for any other applicable reasons.        JAVIER Sabillon  1/15/2019     normal...

## 2022-11-06 NOTE — ED PROVIDER NOTE - CLINICAL SUMMARY MEDICAL DECISION MAKING FREE TEXT BOX
67 year old Female with possible foreign body in feet. Will obtain X-ray, and possible podiatrist consult.

## 2022-11-06 NOTE — ED PROVIDER NOTE - PATIENT PORTAL LINK FT
You can access the FollowMyHealth Patient Portal offered by [facility] by registering at the following website: [email or web address]. By joining [employer]’s FollowMyHealth portal, you will also be able to view your health information using other applications (apps) compatible with our system.

## 2022-11-06 NOTE — ED PROVIDER NOTE - NSFOLLOWUPCLINICS_GEN_ALL_ED_FT
[facility]  Podiatry/Wound Care  [address] [place] [address]  Phone: [phone]  Fax: [phone]

## 2022-11-06 NOTE — ED PROVIDER NOTE - WR INTERPRETATION 1
+foreign body in between right 4th and 5th metatarsals, questionable foreign body on heel of left foot.

## 2022-11-06 NOTE — CONSULT NOTE ADULT - SUBJECTIVE AND OBJECTIVE BOX
Podiatry HPI: Patient is a 67y old  Male who presents with a chief complaint of a foreign body in his right and left foot. Patient in disheveled appearance, seen wearing Croc type shoes and requesting for a sandwhich before was seen. 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 He thinks he stepped on a piece of glass 10 days ago upon his last visit to the ED for same reason however states that all the glass was not taken out and it has been bothering him. Patient reports he does not hear well. Has no other past medical history. Does not take any medication. Reports no other pedal complaints. Has no constitutional symptoms.      PMH: [place] (hard of hearing), bilateral    No pertinent past medical history      Allergies: No Known Allergies    Medications:   FH:  PSX: No significant past surgical history      SH:     Vital Signs Last 24 Hrs  T(C): 36.6 (21 Oct 2022 10:50), Max: 36.6 (21 Oct 2022 10:50)  T(F): 97.8 (21 Oct 2022 10:50), Max: 97.8 (21 Oct 2022 10:50)  HR: 80 (21 Oct 2022 14:30) (80 - 95)  BP: 120/77 (21 Oct 2022 14:30) (118/86 - 120/77)  BP(mean): --  RR: 18 (21 Oct 2022 14:30) (18 - 18)  SpO2: 99% (21 Oct 2022 14:30) (99% - 99%)    Parameters below as of 21 Oct 2022 14:30  Patient On (Oxygen Delivery Method): room air        LABS                      ROS  REVIEW OF SYSTEMS: unremarkable outside of PE        PHYSICAL EXAM  LE Focused:    Vasc:  DP and PT pulses 2/4 bilateral. TG: warm to cool. No edema. Pedal hair absent  Derm: right foot submet 4 dark foreign body visible within the hyperkeratotic tissue, sized 0.5x0.2, no edema, erythema, purulence, or any other drainage seen on exam.  Left foot lateral heel dark foreign body visible within foreign body/dried up blood as the portal of entry. Patient's feet have some other dirt and debris   Neuro: protective sensation intact  MSK: patient has antalgic gate due to the foreign body in his right foot       IMAGING:   < from: Xray Foot AP + Lateral + Oblique, Bilat (11.06.22 @ 12:36) >    IMPRESSION:  Multiple punctate radiopaque foreign bodies in the forefoot and plantar   to the calcaneus, new    --- End of Report ---    < end of copied text >    CULTURES:     A:  Left and Right foot superficial foreign object      P:   Patient evaluated and Chart reviewed   Discussed diagnosis and treatment with patient  Verbal consent obtained for debridement of foreign object in hyperkeratotic tissue  Debridement of hyperkeratotic tissue with 15 blade of Right foot submet 4, foreign object located superficially and is almost immediately removed  Debridement of hyperkeratotic tissue with 15 blade of left foot heel foreign object located superficially   No drainage, edema or erythema is seen upon debrideme  Area is cleaned with betadine  Applied band aid to right foot submet 4 and left lateral heel dressed with DSD due to bleeding    Pre and post debridement X-rays reviewed to ensure foreign body is removed  Weight bearing as tolerated   Offloading to bilateral Heels.   Pt to follow up at [facility] at [address], [place] [address] with Dr Michelle  Discussed with Dr. Michelle

## 2022-11-06 NOTE — ED PROVIDER NOTE - OBJECTIVE STATEMENT
67 year old Male who is hard of hearing presents to the ED with complaint of glass in both feet for 10 days. 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 Patient denies fevers, drainage, numbness, and tingling. Patient was seen 16 days ago with similar symptoms, and had foreign body removed. NKDA.

## 2022-11-06 NOTE — ED PROVIDER NOTE - PROGRESS NOTE DETAILS
Feet cleaned. Podiatry to come see patient to remove foreign bodies. Dalila Rodriguez, FNP-BC Foreign bodies removed by podiatry. Repeat xrays improved. Will have patient follow up with podiatry. dc home with antibiotics.

## 2023-09-14 NOTE — ED ADULT NURSE NOTE - NSFALLRSKASSESSTYPE_ED_ALL_ED
Medication: b-12, aspirin, donepezil, calcuim, hydrochlorothiazide  Last office visit date: 2/15/2023  Medication Refill Protocol Failed.  Failed criteria: see below. Sent to clinician to review.             Initial (On Arrival)

## 2023-12-10 NOTE — ED PROVIDER NOTE - NEUROLOGICAL, MLM
Goal Outcome Evaluation:  Plan of Care Reviewed With: patient        Progress: no change  Outcome Evaluation: Patient not appropriate for skilled OT services at this time as patient appears to be at prior level. patient requires assist for adls and assist for transfers to wheelchair at long term care facility at baseline.  patient safe to d/c to previous setting when medically appropriate. d/c occupational therapy services.      Anticipated Discharge Disposition (OT): extended care facility   Alert and oriented, no focal deficits, no motor or sensory deficits.

## 2024-08-12 ENCOUNTER — EMERGENCY (EMERGENCY)
Facility: HOSPITAL | Age: 69
LOS: 1 days | Discharge: HOME | End: 2024-08-12
Attending: EMERGENCY MEDICINE
Payer: SELF-PAY

## 2024-08-12 VITALS
HEIGHT: 71 IN | TEMPERATURE: 98 F | RESPIRATION RATE: 16 BRPM | SYSTOLIC BLOOD PRESSURE: 143 MMHG | OXYGEN SATURATION: 98 % | DIASTOLIC BLOOD PRESSURE: 87 MMHG | HEART RATE: 87 BPM | WEIGHT: 244.05 LBS

## 2024-08-12 VITALS
HEART RATE: 72 BPM | TEMPERATURE: 98 F | DIASTOLIC BLOOD PRESSURE: 74 MMHG | OXYGEN SATURATION: 98 % | RESPIRATION RATE: 17 BRPM | SYSTOLIC BLOOD PRESSURE: 130 MMHG

## 2024-08-12 LAB
ALBUMIN SERPL ELPH-MCNC: 3.6 G/DL — SIGNIFICANT CHANGE UP (ref 3.5–5)
ALP SERPL-CCNC: 88 U/L — SIGNIFICANT CHANGE UP (ref 40–120)
ALT FLD-CCNC: 38 U/L DA — SIGNIFICANT CHANGE UP (ref 10–60)
ANION GAP SERPL CALC-SCNC: 6 MMOL/L — SIGNIFICANT CHANGE UP (ref 5–17)
APPEARANCE UR: CLEAR — SIGNIFICANT CHANGE UP
AST SERPL-CCNC: 22 U/L — SIGNIFICANT CHANGE UP (ref 10–40)
BASOPHILS # BLD AUTO: 0.04 K/UL — SIGNIFICANT CHANGE UP (ref 0–0.2)
BASOPHILS NFR BLD AUTO: 0.6 % — SIGNIFICANT CHANGE UP (ref 0–2)
BILIRUB SERPL-MCNC: 0.5 MG/DL — SIGNIFICANT CHANGE UP (ref 0.2–1.2)
BILIRUB UR-MCNC: NEGATIVE — SIGNIFICANT CHANGE UP
BUN SERPL-MCNC: 16 MG/DL — SIGNIFICANT CHANGE UP (ref 7–18)
CALCIUM SERPL-MCNC: 8.6 MG/DL — SIGNIFICANT CHANGE UP (ref 8.4–10.5)
CHLORIDE SERPL-SCNC: 112 MMOL/L — HIGH (ref 96–108)
CO2 SERPL-SCNC: 23 MMOL/L — SIGNIFICANT CHANGE UP (ref 22–31)
COLOR SPEC: SIGNIFICANT CHANGE UP
CREAT SERPL-MCNC: 0.72 MG/DL — SIGNIFICANT CHANGE UP (ref 0.5–1.3)
DIFF PNL FLD: NEGATIVE — SIGNIFICANT CHANGE UP
EGFR: 98 ML/MIN/1.73M2 — SIGNIFICANT CHANGE UP
EOSINOPHIL # BLD AUTO: 0.19 K/UL — SIGNIFICANT CHANGE UP (ref 0–0.5)
EOSINOPHIL NFR BLD AUTO: 3 % — SIGNIFICANT CHANGE UP (ref 0–6)
GLUCOSE SERPL-MCNC: 104 MG/DL — HIGH (ref 70–99)
GLUCOSE UR QL: NEGATIVE MG/DL — SIGNIFICANT CHANGE UP
HCT VFR BLD CALC: 38.8 % — LOW (ref 39–50)
HGB BLD-MCNC: 12.7 G/DL — LOW (ref 13–17)
IMM GRANULOCYTES NFR BLD AUTO: 0.2 % — SIGNIFICANT CHANGE UP (ref 0–0.9)
KETONES UR-MCNC: ABNORMAL MG/DL
LEUKOCYTE ESTERASE UR-ACNC: NEGATIVE — SIGNIFICANT CHANGE UP
LIDOCAIN IGE QN: 34 U/L — SIGNIFICANT CHANGE UP (ref 13–75)
LYMPHOCYTES # BLD AUTO: 1.95 K/UL — SIGNIFICANT CHANGE UP (ref 1–3.3)
LYMPHOCYTES # BLD AUTO: 30.9 % — SIGNIFICANT CHANGE UP (ref 13–44)
MCHC RBC-ENTMCNC: 30 PG — SIGNIFICANT CHANGE UP (ref 27–34)
MCHC RBC-ENTMCNC: 32.7 GM/DL — SIGNIFICANT CHANGE UP (ref 32–36)
MCV RBC AUTO: 91.5 FL — SIGNIFICANT CHANGE UP (ref 80–100)
MONOCYTES # BLD AUTO: 0.76 K/UL — SIGNIFICANT CHANGE UP (ref 0–0.9)
MONOCYTES NFR BLD AUTO: 12 % — SIGNIFICANT CHANGE UP (ref 2–14)
NEUTROPHILS # BLD AUTO: 3.36 K/UL — SIGNIFICANT CHANGE UP (ref 1.8–7.4)
NEUTROPHILS NFR BLD AUTO: 53.3 % — SIGNIFICANT CHANGE UP (ref 43–77)
NITRITE UR-MCNC: NEGATIVE — SIGNIFICANT CHANGE UP
NRBC # BLD: 0 /100 WBCS — SIGNIFICANT CHANGE UP (ref 0–0)
PH UR: 5.5 — SIGNIFICANT CHANGE UP (ref 5–8)
PLATELET # BLD AUTO: 214 K/UL — SIGNIFICANT CHANGE UP (ref 150–400)
POTASSIUM SERPL-MCNC: 4.2 MMOL/L — SIGNIFICANT CHANGE UP (ref 3.5–5.3)
POTASSIUM SERPL-SCNC: 4.2 MMOL/L — SIGNIFICANT CHANGE UP (ref 3.5–5.3)
PROT SERPL-MCNC: 7.3 G/DL — SIGNIFICANT CHANGE UP (ref 6–8.3)
PROT UR-MCNC: NEGATIVE MG/DL — SIGNIFICANT CHANGE UP
RBC # BLD: 4.24 M/UL — SIGNIFICANT CHANGE UP (ref 4.2–5.8)
RBC # FLD: 13.5 % — SIGNIFICANT CHANGE UP (ref 10.3–14.5)
SARS-COV-2 RNA SPEC QL NAA+PROBE: SIGNIFICANT CHANGE UP
SODIUM SERPL-SCNC: 141 MMOL/L — SIGNIFICANT CHANGE UP (ref 135–145)
SP GR SPEC: 1.03 — SIGNIFICANT CHANGE UP (ref 1–1.03)
UROBILINOGEN FLD QL: 1 MG/DL — SIGNIFICANT CHANGE UP (ref 0.2–1)
WBC # BLD: 6.31 K/UL — SIGNIFICANT CHANGE UP (ref 3.8–10.5)
WBC # FLD AUTO: 6.31 K/UL — SIGNIFICANT CHANGE UP (ref 3.8–10.5)

## 2024-08-12 PROCEDURE — 85025 COMPLETE CBC W/AUTO DIFF WBC: CPT

## 2024-08-12 PROCEDURE — 99284 EMERGENCY DEPT VISIT MOD MDM: CPT

## 2024-08-12 PROCEDURE — 99053 MED SERV 10PM-8AM 24 HR FAC: CPT

## 2024-08-12 PROCEDURE — 99284 EMERGENCY DEPT VISIT MOD MDM: CPT | Mod: 25

## 2024-08-12 PROCEDURE — 36415 COLL VENOUS BLD VENIPUNCTURE: CPT

## 2024-08-12 PROCEDURE — 81003 URINALYSIS AUTO W/O SCOPE: CPT

## 2024-08-12 PROCEDURE — 83690 ASSAY OF LIPASE: CPT

## 2024-08-12 PROCEDURE — 80053 COMPREHEN METABOLIC PANEL: CPT

## 2024-08-12 PROCEDURE — 93971 EXTREMITY STUDY: CPT

## 2024-08-12 PROCEDURE — 93971 EXTREMITY STUDY: CPT | Mod: 26,LT

## 2024-08-12 PROCEDURE — 87635 SARS-COV-2 COVID-19 AMP PRB: CPT

## 2024-08-12 RX ORDER — IBUPROFEN 200 MG
1 TABLET ORAL
Qty: 20 | Refills: 0
Start: 2024-08-12

## 2024-08-12 NOTE — ED PROVIDER NOTE - CARE PROVIDER_API CALL
Cesar Vega  Vascular Surgery  [address], [address]  [place] [address]  Phone: [phone]  Fax: [phone]  Follow Up Time:     Wayne Roldan  Vascular Surgery  [address], [address]  [place] [address]  Phone: [phone]  Fax: [phone]  Follow Up Time:

## 2024-08-12 NOTE — ED PROVIDER NOTE - PHYSICAL EXAMINATION
Bilateral varicose veins  Venous stasis changes bilateral lower extremity right greater than left  2 contiguous areas of focal subcutaneous swelling and tenderness 2 x 2 cm each

## 2024-08-12 NOTE — ED ADULT NURSE NOTE - NSFALLHARMRISKINTERV_ED_ALL_ED
Communicate risk of Fall with Harm to all staff, patient, and family/Provide visual cue: red socks, yellow wristband, yellow gown, etc/Reinforce activity limits and safety measures with patient and family/Use of alarms - bed, stretcher, chair and/or video monitoring/Bed in lowest position, wheels locked, appropriate side rails in place/Call bell, personal items and telephone in reach/Instruct patient to call for assistance before getting out of bed/chair/stretcher/Non-slip footwear applied when patient is off stretcher/[place] to call system/Physically safe environment - no spills, clutter or unnecessary equipment/Purposeful Proactive Rounding/Room/bathroom lighting operational, light cord in reach

## 2024-08-12 NOTE — ED PROVIDER NOTE - CARE PROVIDERS DIRECT ADDRESSES
[email or web address]

## 2024-08-12 NOTE — ED PROVIDER NOTE - CLINICAL SUMMARY MEDICAL DECISION MAKING FREE TEXT BOX
69-year-old male presenting with areas of swelling to left thigh.  Exam suggests varicose veins/phlebitis.  No local lymphadenopathy or signs of infection.  Normal neuroexam.  Patient is ambulatory without difficulty.  Plan to perform Doppler of her leg, labs, COVID swab and reassess.

## 2024-08-12 NOTE — ED PROVIDER NOTE - PATIENT PORTAL LINK FT
You can access the FollowMyHealth Patient Portal offered by [facility] by registering at the following website: [email or web address]. By joining [employer]’s FollowMyHealth portal, you will also be able to view your health information using other applications (apps) compatible with our system.

## 2024-08-12 NOTE — ED PROVIDER NOTE - NSFOLLOWUPCLINICS_GEN_ALL_ED_FT
[facility]  [address] [place] [address]  Phone: [phone]  Fax: [phone]

## 2024-08-12 NOTE — ED PROVIDER NOTE - NSFOLLOWUPINSTRUCTIONS_ED_ALL_ED_FT
Phlebitis    WHAT YOU NEED TO KNOW:    What is phlebitis? Phlebitis is inflammation of the wall of your vein. Inflammation may be caused by damage to or infection of your vein. Phlebitis may occur in a vein in your arm or leg. Symptoms include pain, redness, and swelling near the vein. Symptoms may appear when you are receiving an IV medication, or 48 to 96 hours after you receive the medicine.  Phlebitis    What increases my risk for phlebitis?    An IV catheter placed in a vein in your arm or leg    IV injections of amiodarone, vancomycin, ciprofloxacin, or chemotherapy medicines    A condition that affects your blood vessels, such as varicose veins or venous insufficiency    IV drug abuse  How is phlebitis treated? Medicines may be given to decrease pain and swelling. Your IV catheter will be removed. Do the following to manage your symptoms:    Apply a warm compress to your vein. This will help decrease swelling and pain. Wet a washcloth in warm water. Do not use hot water. Apply the warm compress for 10 minutes. Repeat this 4 times each day.    Elevate your leg or arm above the level of your heart as often as you can. This will help decrease swelling and pain. Prop your leg or arm on pillows or blankets to keep it elevated comfortably.    Wear pressure stockings if directed. Pressure stockings improve blood flow and help decrease pain and swelling. Pressure stockings can also help decrease your risk for blood clots in your legs. Wear the stockings during the day. Do not wear them overnight when you sleep.  Pressure Stockings       Do not inject illegal drugs. Talk to your healthcare provider if you use IV drugs and need help to quit.  When should I seek immediate care?    Your leg or arm turns pale or blue.    Your leg or arm feels hot or cold.    Your arm or leg feels warm, tender, and painful. It may look swollen and red.  When should I contact my healthcare provider?    You have a fever.    You have more pain, swelling, or warmth near your vein.    Your symptoms do not improve within 72 hours.    You have questions or concerns about your condition or care.  CARE AGREEMENT:    You have the right to help plan your care. Learn about your health condition and how it may be treated. Discuss treatment options with your healthcare providers to decide what care you want to receive. You always have the right to refuse treatment.    © [employer] 1973, 2024

## 2024-08-12 NOTE — ED ADULT NURSE NOTE - OBJECTIVE STATEMENT
Patient presented with lump on the left inner upper thigh area. The lump is painful to palpation, the area around the lump has redness and is warm to touch. Patient presented with lump on the left inner upper thigh area x few days. Upon assessment The lump is painful to palpation, the area around the lump has redness and is warm to touch. Patient denies falls, injuries or trauma to area, denies chest pain. No SOB.

## 2024-08-12 NOTE — ED PROVIDER NOTE - OBJECTIVE STATEMENT
69-year-old male history of BPH, hard of hearing presenting with lump to left thigh which she noticed several weeks ago.  It has not become tender over the past week.  Patient has family history of lymphoma and hip fractures and was concerned he had 1 of those conditions.  Patient is ambulatory denies any fall.  Denies any weakness, numbness, tingling.  Denies any redness or discharge from area.

## 2024-08-14 NOTE — ED PROVIDER NOTE - RESPIRATORY, MLM
CT ,call [phone] to scheduled    Follow up in clinic in 3-4 months     Breath sounds clear and equal bilaterally.

## 2024-08-27 NOTE — ED ADULT NURSE NOTE - DISCHARGE DATE/TIME
24  Shikha Land : 1949 Sex: female  Age: 75 y.o.    Patient was referred by Jerome Valdez MD    Chief Complaint   Patient presents with    Nail Problem    Toe Pain     Jerome Valdez MD  24  Right great toenail fungus  Pt went on vacation her right heel started to bother her  Went into [facility] had prednisone for her back   Went to an urgent care but had no xrays        SUBJECTIVE patient is seen for 2 different reasons #1 she is off the Lamisil fungus toenail looks excellent #2 right heel and the right heel has been bothering her for multiple years about 2 months ago or 6 weeks ago she was on vacation and it flared up so much she had to be in a wheelchair she is here to discuss options.  Toe Pain       Review of Systems  Const: Denies constitutional symptoms  Musculo: Denies symptoms other than stated above  Skin: Denies symptoms other than stated above       Current Outpatient Medications:     PARoxetine (PAXIL) 10 MG tablet, Take 1 tablet by mouth daily, Disp: , Rfl:     vitamin B-12 (CYANOCOBALAMIN) 100 MCG tablet, Take 0.5 tablets by mouth daily, Disp: , Rfl:     Biotin 100 MG/GM POWD, Take by mouth, Disp: , Rfl:     Cholecalciferol (VITAMIN D3) 1.25 MG (25841 UT) CAPS, Take by mouth, Disp: , Rfl:     Handicap Placard MISC, by Does not apply route 6 year duration  Pt unable to walk 30 feet do to foot pain chronic, Disp: 1 each, Rfl: 0    ciclopirox (PENLAC) 8 % solution, Apply topically nightly., Disp: 6 mL, Rfl: 2    amLODIPine (NORVASC) 10 MG tablet, , Disp: , Rfl:     PREMARIN 0.3 MG tablet, , Disp: , Rfl:     omeprazole (PRILOSEC) 20 MG delayed release capsule, Take 2 capsules by mouth daily, Disp: , Rfl:   No Known Allergies    No past medical history on file.  No past surgical history on file.  No family history on file.  Social History     Socioeconomic History    Marital status:      Spouse name: Not on file    Number of children: Not on file    Years of 
17-Aug-2020 12:53

## 2024-12-06 ENCOUNTER — EMERGENCY (EMERGENCY)
Facility: HOSPITAL | Age: 69
LOS: 1 days | Discharge: HOME | End: 2024-12-06
Attending: STUDENT IN AN ORGANIZED HEALTH CARE EDUCATION/TRAINING PROGRAM
Payer: SELF-PAY

## 2024-12-06 VITALS
TEMPERATURE: 98 F | DIASTOLIC BLOOD PRESSURE: 83 MMHG | OXYGEN SATURATION: 99 % | SYSTOLIC BLOOD PRESSURE: 145 MMHG | RESPIRATION RATE: 20 BRPM | HEART RATE: 90 BPM

## 2024-12-06 VITALS
HEART RATE: 83 BPM | TEMPERATURE: 98 F | OXYGEN SATURATION: 100 % | RESPIRATION RATE: 20 BRPM | SYSTOLIC BLOOD PRESSURE: 167 MMHG | WEIGHT: 240.08 LBS | DIASTOLIC BLOOD PRESSURE: 101 MMHG

## 2024-12-06 LAB
A1C WITH ESTIMATED AVERAGE GLUCOSE RESULT: 5.9 % — HIGH (ref 4–5.6)
ALBUMIN SERPL ELPH-MCNC: 3.7 G/DL — SIGNIFICANT CHANGE UP (ref 3.5–5)
ALP SERPL-CCNC: 88 U/L — SIGNIFICANT CHANGE UP (ref 40–120)
ALT FLD-CCNC: 55 U/L DA — SIGNIFICANT CHANGE UP (ref 10–60)
ANION GAP SERPL CALC-SCNC: 3 MMOL/L — LOW (ref 5–17)
ANION GAP SERPL CALC-SCNC: 7 MMOL/L — SIGNIFICANT CHANGE UP (ref 5–17)
AST SERPL-CCNC: 86 U/L — HIGH (ref 10–40)
BASOPHILS # BLD AUTO: 0.03 K/UL — SIGNIFICANT CHANGE UP (ref 0–0.2)
BASOPHILS NFR BLD AUTO: 0.5 % — SIGNIFICANT CHANGE UP (ref 0–2)
BILIRUB SERPL-MCNC: 0.6 MG/DL — SIGNIFICANT CHANGE UP (ref 0.2–1.2)
BUN SERPL-MCNC: 12 MG/DL — SIGNIFICANT CHANGE UP (ref 7–18)
BUN SERPL-MCNC: 14 MG/DL — SIGNIFICANT CHANGE UP (ref 7–18)
CALCIUM SERPL-MCNC: 8.8 MG/DL — SIGNIFICANT CHANGE UP (ref 8.4–10.5)
CALCIUM SERPL-MCNC: 8.9 MG/DL — SIGNIFICANT CHANGE UP (ref 8.4–10.5)
CHLORIDE SERPL-SCNC: 107 MMOL/L — SIGNIFICANT CHANGE UP (ref 96–108)
CHLORIDE SERPL-SCNC: 110 MMOL/L — HIGH (ref 96–108)
CO2 SERPL-SCNC: 26 MMOL/L — SIGNIFICANT CHANGE UP (ref 22–31)
CO2 SERPL-SCNC: 28 MMOL/L — SIGNIFICANT CHANGE UP (ref 22–31)
CREAT SERPL-MCNC: 0.74 MG/DL — SIGNIFICANT CHANGE UP (ref 0.5–1.3)
CREAT SERPL-MCNC: 0.95 MG/DL — SIGNIFICANT CHANGE UP (ref 0.5–1.3)
CRP SERPL-MCNC: <2.9 MG/L — SIGNIFICANT CHANGE UP (ref 0–5)
EGFR: 86 ML/MIN/1.73M2 — SIGNIFICANT CHANGE UP
EGFR: 97 ML/MIN/1.73M2 — SIGNIFICANT CHANGE UP
EOSINOPHIL # BLD AUTO: 0.11 K/UL — SIGNIFICANT CHANGE UP (ref 0–0.5)
EOSINOPHIL NFR BLD AUTO: 1.7 % — SIGNIFICANT CHANGE UP (ref 0–6)
ERYTHROCYTE [SEDIMENTATION RATE] IN BLOOD: 12 MM/HR — SIGNIFICANT CHANGE UP (ref 0–20)
ESTIMATED AVERAGE GLUCOSE: 123 MG/DL — HIGH (ref 68–114)
GLUCOSE SERPL-MCNC: 104 MG/DL — HIGH (ref 70–99)
GLUCOSE SERPL-MCNC: 99 MG/DL — SIGNIFICANT CHANGE UP (ref 70–99)
HCT VFR BLD CALC: 38 % — LOW (ref 39–50)
HGB BLD-MCNC: 13 G/DL — SIGNIFICANT CHANGE UP (ref 13–17)
IMM GRANULOCYTES NFR BLD AUTO: 0.3 % — SIGNIFICANT CHANGE UP (ref 0–0.9)
LYMPHOCYTES # BLD AUTO: 1.9 K/UL — SIGNIFICANT CHANGE UP (ref 1–3.3)
LYMPHOCYTES # BLD AUTO: 29.1 % — SIGNIFICANT CHANGE UP (ref 13–44)
MCHC RBC-ENTMCNC: 30.9 PG — SIGNIFICANT CHANGE UP (ref 27–34)
MCHC RBC-ENTMCNC: 34.2 G/DL — SIGNIFICANT CHANGE UP (ref 32–36)
MCV RBC AUTO: 90.3 FL — SIGNIFICANT CHANGE UP (ref 80–100)
MONOCYTES # BLD AUTO: 0.69 K/UL — SIGNIFICANT CHANGE UP (ref 0–0.9)
MONOCYTES NFR BLD AUTO: 10.6 % — SIGNIFICANT CHANGE UP (ref 2–14)
NEUTROPHILS # BLD AUTO: 3.78 K/UL — SIGNIFICANT CHANGE UP (ref 1.8–7.4)
NEUTROPHILS NFR BLD AUTO: 57.8 % — SIGNIFICANT CHANGE UP (ref 43–77)
NRBC # BLD: 0 /100 WBCS — SIGNIFICANT CHANGE UP (ref 0–0)
PLATELET # BLD AUTO: 216 K/UL — SIGNIFICANT CHANGE UP (ref 150–400)
POTASSIUM SERPL-MCNC: 3.7 MMOL/L — SIGNIFICANT CHANGE UP (ref 3.5–5.3)
POTASSIUM SERPL-MCNC: 6.5 MMOL/L — CRITICAL HIGH (ref 3.5–5.3)
POTASSIUM SERPL-SCNC: 3.7 MMOL/L — SIGNIFICANT CHANGE UP (ref 3.5–5.3)
POTASSIUM SERPL-SCNC: 6.5 MMOL/L — CRITICAL HIGH (ref 3.5–5.3)
PROT SERPL-MCNC: 7.8 G/DL — SIGNIFICANT CHANGE UP (ref 6–8.3)
RBC # BLD: 4.21 M/UL — SIGNIFICANT CHANGE UP (ref 4.2–5.8)
RBC # FLD: 13.3 % — SIGNIFICANT CHANGE UP (ref 10.3–14.5)
SODIUM SERPL-SCNC: 138 MMOL/L — SIGNIFICANT CHANGE UP (ref 135–145)
SODIUM SERPL-SCNC: 143 MMOL/L — SIGNIFICANT CHANGE UP (ref 135–145)
WBC # BLD: 6.53 K/UL — SIGNIFICANT CHANGE UP (ref 3.8–10.5)
WBC # FLD AUTO: 6.53 K/UL — SIGNIFICANT CHANGE UP (ref 3.8–10.5)

## 2024-12-06 PROCEDURE — 83036 HEMOGLOBIN GLYCOSYLATED A1C: CPT

## 2024-12-06 PROCEDURE — 99285 EMERGENCY DEPT VISIT HI MDM: CPT

## 2024-12-06 PROCEDURE — 73590 X-RAY EXAM OF LOWER LEG: CPT

## 2024-12-06 PROCEDURE — 93971 EXTREMITY STUDY: CPT | Mod: 26,RT

## 2024-12-06 PROCEDURE — 86140 C-REACTIVE PROTEIN: CPT

## 2024-12-06 PROCEDURE — 36415 COLL VENOUS BLD VENIPUNCTURE: CPT

## 2024-12-06 PROCEDURE — 93971 EXTREMITY STUDY: CPT

## 2024-12-06 PROCEDURE — 80053 COMPREHEN METABOLIC PANEL: CPT

## 2024-12-06 PROCEDURE — 93010 ELECTROCARDIOGRAM REPORT: CPT

## 2024-12-06 PROCEDURE — 93005 ELECTROCARDIOGRAM TRACING: CPT

## 2024-12-06 PROCEDURE — 73590 X-RAY EXAM OF LOWER LEG: CPT | Mod: 26,RT

## 2024-12-06 PROCEDURE — 85652 RBC SED RATE AUTOMATED: CPT

## 2024-12-06 PROCEDURE — 99285 EMERGENCY DEPT VISIT HI MDM: CPT | Mod: 25

## 2024-12-06 PROCEDURE — 80048 BASIC METABOLIC PNL TOTAL CA: CPT

## 2024-12-06 PROCEDURE — 85025 COMPLETE CBC W/AUTO DIFF WBC: CPT

## 2024-12-06 RX ORDER — BACITRACIN ZINC 500 UNIT/G
1 OINTMENT (GRAM) TOPICAL ONCE
Refills: 0 | Status: COMPLETED | OUTPATIENT
Start: 2024-12-06 | End: 2024-12-06

## 2024-12-06 RX ADMIN — Medication 1 APPLICATION(S): at 16:14

## 2024-12-06 NOTE — ED PROVIDER NOTE - NSFOLLOWUPCLINICS_GEN_ALL_ED_FT
[facility]  Podiatry/Wound Care  [address] [place] [address]  Phone: [phone]  Fax: [phone]

## 2024-12-06 NOTE — ED ADULT NURSE REASSESSMENT NOTE - NS ED NURSE REASSESS COMMENT FT1
provided fall precaution, bed alarm, YG, fall precaution wrist ID band, red socks. instructed pt to call us when he needs help.  verbalized understanding.

## 2024-12-06 NOTE — ED PROVIDER NOTE - CHIEF COMPLAINT
Reassess pt at bedside, continue to monitor if patient has more episodes
The patient is a 69y Male complaining of

## 2024-12-06 NOTE — ED ADULT NURSE NOTE - OBJECTIVE STATEMENT
pt is here for wound check.  pt stated that sending by MD d/t right leg infection, denies chest pain or shortness of breath, denies fever or chills, hard of hearing,

## 2024-12-06 NOTE — ED PROVIDER NOTE - NSFOLLOWUPINSTRUCTIONS_ED_ALL_ED_FT
Thank you for choosing [facility] for your healthcare.    You were seen in the Emergency Department for an ulcer and skin changes to your leg.  In the emergency department you had blood testing which showed no evidence of an infection at this time.  You had an ultrasound which showed no signs of a blood clot in your leg.  You had an x-ray which showed no evidence of a piece of glass or retained foreign body at this time.  You can call the hospital Monday through Friday during business hours to ask to speak to someone in the financial aid department to discuss what your options are for Medicare or Medicaid enrollment and if the hospital is able to help you.  We are including the information for our podiatrists office who you can call to make an appointment to help continue to treat the ulcer on your leg.  Please return to the emergency department for any further emergent or concerning medical issues.

## 2024-12-06 NOTE — ED PROVIDER NOTE - PATIENT PORTAL LINK FT
You can access the FollowMyHealth Patient Portal offered by [facility] by registering at the following website: [email or web address]. By joining [facility]’s FollowMyHealth portal, you will also be able to view your health information using other applications (apps) compatible with our system.

## 2024-12-06 NOTE — ED PROVIDER NOTE - CLINICAL SUMMARY MEDICAL DECISION MAKING FREE TEXT BOX
69-year-old male with unknown past medical history, hard of hearing coming in with chronic leg wound.  Denies chest pain, difficulty breathing, fevers, chills, cough, abdominal pain, nausea, vomiting, diarrhea, discharge from the wound.  As per the triage note patient was sent from MD office–patient states he came in himself to be checked for diabetes and his kidney function.  Denies leg pain.  Patient is nontoxic-appearing.  No distress.  Abdomen is soft nontender.  No calf swelling or tenderness to palpation.  Patient is noted to have mild superficial ulceration about 1 cm at the distal tib-fib region.  No surrounding erythema, increased warmth or crepitus appreciated.  Patient has discoloration of the lower extremities consistent with chronic dermal stasis.  Patient has equal bilateral pedal pulses.  Equal strength and sensation in lower extremities.  Plan to eval for DVT, electrolyte abnormalities, anemia, kidney function, diabetes.  Patient lives in his own apartment, gets help from his neighbor as needed.  Denies using any ambulatory devices.  States he is able to cook for himself.

## 2024-12-06 NOTE — ED ADULT NURSE NOTE - NS ED NURSE IV DC DT
Pt without complaint, tolerated IVIGG without incident, pt declines AVS, aware of future appts  06-Dec-2024 18:19

## 2024-12-06 NOTE — ED PROVIDER NOTE - PROGRESS NOTE DETAILS
Received signout on patient.  Repeat potassium normal not hemolyzed.  Ultrasound shows no evidence of a DVT.  Discussed findings with patient.  Clinically exam seems more consistent with chronic venous stasis disease opposed to infectious cellulitis.  Do not believe patient requires admission or antibiotics at this time.  Will refer patient to podiatry as outpatient for wound care.

## 2025-02-05 NOTE — ED ADULT NURSE NOTE - NSFALLLASTSIX_ED_ALL_ED
In an effort to ensure that our patients LiveWell, a Team Member has reviewed your chart and identified an opportunity to provide the best care possible. An attempt was made to discuss or schedule due or overdue Preventive or Chronic Condition care.Care Gaps identified: Hypertension and Immunizations.    The Outcome was Contact was not made, left message. We are attempting to schedule a nurse visit. If you have any questions or need help with scheduling, contact your primary care provider..   Type of Appointment needed:  nurse visit    LV and provided CB number.  
No.

## 2025-06-15 ENCOUNTER — EMERGENCY (EMERGENCY)
Facility: HOSPITAL | Age: 70
LOS: 1 days | End: 2025-06-15
Attending: STUDENT IN AN ORGANIZED HEALTH CARE EDUCATION/TRAINING PROGRAM
Payer: SELF-PAY

## 2025-06-15 VITALS
HEART RATE: 81 BPM | SYSTOLIC BLOOD PRESSURE: 127 MMHG | OXYGEN SATURATION: 99 % | RESPIRATION RATE: 16 BRPM | DIASTOLIC BLOOD PRESSURE: 81 MMHG | TEMPERATURE: 98 F

## 2025-06-15 VITALS
WEIGHT: 246.92 LBS | HEIGHT: 68 IN | SYSTOLIC BLOOD PRESSURE: 110 MMHG | RESPIRATION RATE: 18 BRPM | TEMPERATURE: 98 F | HEART RATE: 92 BPM | OXYGEN SATURATION: 100 % | DIASTOLIC BLOOD PRESSURE: 66 MMHG

## 2025-06-15 PROCEDURE — 73630 X-RAY EXAM OF FOOT: CPT | Mod: 26,RT

## 2025-06-15 PROCEDURE — 99053 MED SERV 10PM-8AM 24 HR FAC: CPT

## 2025-06-15 PROCEDURE — 10120 INC&RMVL FB SUBQ TISS SMPL: CPT

## 2025-06-15 PROCEDURE — 99284 EMERGENCY DEPT VISIT MOD MDM: CPT | Mod: 25

## 2025-06-15 PROCEDURE — 73630 X-RAY EXAM OF FOOT: CPT

## 2025-06-15 PROCEDURE — 99285 EMERGENCY DEPT VISIT HI MDM: CPT

## 2025-06-15 RX ORDER — LIDOCAINE HCL/PF 10 MG/ML
10 VIAL (ML) INJECTION ONCE
Refills: 0 | Status: COMPLETED | OUTPATIENT
Start: 2025-06-15 | End: 2025-06-15

## 2025-06-15 RX ORDER — CEFUROXIME SODIUM 1.5 G
1 VIAL (EA) INJECTION
Qty: 10 | Refills: 0
Start: 2025-06-15 | End: 2025-06-19

## 2025-06-15 RX ADMIN — Medication 10 MILLILITER(S): at 12:35

## 2025-06-15 NOTE — CONSULT NOTE ADULT - SUBJECTIVE AND OBJECTIVE BOX
Podiatry HPI: Patient without any known PMHx presents to ED with complaint of piece of glass on his right foot. Patient complains of pain while walking at the area of glass. Denies any signs of infection to the foot and fever, nausea, vomiting or sob.     PMH:[place] (hard of hearing), bilateral  No pertinent past medical history    Allergies: No Known Allergies    Medications: lidocaine 1% Injectable 10 milliLiter(s) Local Injection Once    FH:  PSX: No significant past surgical history      SH: lidocaine 1% Injectable 10 milliLiter(s) Local Injection Once      Vital Signs Last 24 Hrs  T(C): 36.6 (15 Layton 2025 11:00), Max: 36.7 (15 Layton 2025 07:47)  T(F): 97.9 (15 [place]ton 2025 11:00), Max: 98 (15 Layton 2025 07:47)  HR: 74 (15 [place]ton 2025 11:00) (74 - 92)  BP: 120/79 (15 Layton 2025 11:00) (110/66 - 120/79)  BP(mean): --  RR: 17 (15 [place]ton 2025 11:00) (17 - 18)  SpO2: 99% (15 Layton 2025 11:00) (99% - 100%)    Parameters below as of 15 Layton 2025 11:00  Patient On (Oxygen Delivery Method): room air    LABS              ROS  REVIEW OF SYSTEMS:    CONSTITUTIONAL: No fever, weight loss, or fatigue  EYES: No eye pain, visual disturbances, or discharge  ENMT:  No difficulty hearing, tinnitus, vertigo; No sinus or throat pain  NECK: No pain or stiffness  BREASTS: No pain, masses, or nipple discharge  RESPIRATORY: No cough, wheezing, chills or hemoptysis; No shortness of breath  CARDIOVASCULAR: No chest pain, palpitations, dizziness, or leg swelling  GASTROINTESTINAL: No abdominal or epigastric pain. No nausea, vomiting, or hematemesis; No diarrhea or constipation. No melena or hematochezia.  GENITOURINARY: No dysuria, frequency, hematuria, or incontinence  NEUROLOGICAL: No headaches, memory loss, loss of strength, numbness, or tremors  SKIN: No itching, burning, rashes, or lesions   LYMPH NODES: No enlarged glands  ENDOCRINE: No heat or cold intolerance; No hair loss  MUSCULOSKELETAL: No joint pain or swelling; No muscle, back, or extremity pain  PSYCHIATRIC: No depression, anxiety, mood swings, or difficulty sleeping  HEME/LYMPH: No easy bruising, or bleeding gums  ALLERY AND IMMUNOLOGIC: No hives or eczema    LE Focused Exam  Vascular: DP 2/4 PT 2/4 b/l. CFT <3 secs x 10. Temp Gradient warm to cool b/l. Pedal hair present. NO edema, NO erythema, NO varicosities  Dermatological: Right foot no open wounds, except a small cut sub met 3 consistent with where the foreign body is. Upon debridement of superficial skin, about 2 mm piece of glass found and removed. No further signs of foreign body.  NO clinical signs of infection. -malodor, -ptb, -soft tissue crepitus - fluctuance -soft tissue crepitus  Neurological: Patient is awake, alert and oriented x3.   MSK: Deferred at this time.    IMAGING:  Right foot xray shows small foreign body at plantar sulcus area superficial to skin- appreciated on lateral. Appears to be sub met 3 area on oblique consistent with where he points to.

## 2025-06-15 NOTE — ED PROVIDER NOTE - CLINICAL SUMMARY MEDICAL DECISION MAKING FREE TEXT BOX
70-year-old male with no known past medical history coming in with concern for piece of glass in his right foot.  He reports he was doing a art project and stepped on a speck of glass last night.  No fevers, chills, chest pain, difficulty breathing.  Did not fall or hit his head.    Patient is nontoxic-appearing.  No distress.  Patient is pointing below the right 1st and 2nd toe.  Has mild tenderness to palpation with no foreign body appreciated on exam.    Differential diagnoses include but not limited to foreign body embedded into the skin.  Plan to do an x-ray to eval.  X-ray does show a speck of hyperdensity. Podiatry is consutled.

## 2025-06-15 NOTE — ED ADULT NURSE NOTE - OBJECTIVE STATEMENT
Pt presented to ED c/o has a piece of glass to RLE   Pt is [place] and prefers to communicate via writing.

## 2025-06-15 NOTE — CONSULT NOTE ADULT - ASSESSMENT
A: s/p Right foot piece of glass removal      P:   Patient evaluated and Chart reviewed   Discussed diagnosis and treatment with patient  Applied betadine with dry sterile dressing  Superficial excisional debridement down to and including skin of Right foot plantar with #15 blade. about 2 mm piece of glass removed. No signs of residual FB  X-rays evaluated, foreign body not seen on s/p glass removal x rays  rec discharge with PO abx  Ordered RAISA/PVR  WBAT  Discussed with Attending Dr. Garsia   A: s/p Right foot piece of glass removal      P:   Patient evaluated and Chart reviewed   Discussed diagnosis and treatment with patient  Applied betadine with dry sterile dressing  Superficial excisional debridement down to and including skin of Right foot plantar with #15 blade. about 2 mm piece of glass removed. No signs of residual FB  X-rays evaluated, foreign body not seen on s/p glass removal x rays  rec discharge with PO abx  WBAT  Discussed with Attending Dr. Garsia

## 2025-06-15 NOTE — ED PROVIDER NOTE - NSFOLLOWUPINSTRUCTIONS_ED_ALL_ED_FT
You were seen today for foreign body in your right foot that is removed by the specialist.  Please follow-up with the team outpatient.  If you start developing fevers, chills, pus drainage, increasing pain a bit any other concerning symptoms please seek medical assistance immediately.

## 2025-06-15 NOTE — ED PROVIDER NOTE - PATIENT PORTAL LINK FT
You can access the FollowMyHealth Patient Portal offered by [facility] by registering at the following website: [email or web address]. By joining [facility]’s FollowMyHealth portal, you will also be able to view your health information using other applications (apps) compatible with our system.